# Patient Record
Sex: FEMALE | Race: WHITE | NOT HISPANIC OR LATINO | Employment: OTHER | URBAN - METROPOLITAN AREA
[De-identification: names, ages, dates, MRNs, and addresses within clinical notes are randomized per-mention and may not be internally consistent; named-entity substitution may affect disease eponyms.]

---

## 2018-02-21 NOTE — PATIENT DISCUSSION
(X20.488) Keratoconjunct sicca, not specified as Sjogren's, bilateral - Assesment : Examination revealed Dry Eye Syndrome - Plan : Monitor for changes. Advised patient to call our office with decreased vision or increased symptoms.

## 2018-02-21 NOTE — PATIENT DISCUSSION
(H25.013) Cortical age-related cataract, bilateral - Assesment : Examination revealed Cortical Senile Cataract. Patient is currently asymptomatic and functioning well. - Plan : Monitor for changes. Advised patient to call our office with decreased vision or increased symptoms.  1 year Exam

## 2018-12-21 NOTE — PATIENT DISCUSSION
(J24.201) Keratoconjunct sicca, not specified as Sjogren's, bilateral - Assesment : Examination revealed Dry Eye Syndrome - Plan : Monitor for changes. Advised patient to call our office with decreased vision or increased symptoms.

## 2018-12-21 NOTE — PATIENT DISCUSSION
(H43.811) Vitreous degeneration, right eye - Assesment : Examination revealed PVD. No holes, tears or breaks seen with 360 view. - Plan : Monitor for changes. Advised patient to call our office with decreased vision or an increase in flashes and/or floaters. RTC 1 year/EXAM, or sooner if symptoms worsen or does not improve.

## 2019-11-18 ENCOUNTER — RETINA CONSULT (OUTPATIENT)
Dept: URBAN - METROPOLITAN AREA CLINIC 43 | Facility: CLINIC | Age: 69
End: 2019-11-18

## 2019-11-18 DIAGNOSIS — E11.3293: ICD-10-CM

## 2019-11-18 DIAGNOSIS — H35.363: ICD-10-CM

## 2019-11-18 DIAGNOSIS — H35.723: ICD-10-CM

## 2019-11-18 DIAGNOSIS — H35.3122: ICD-10-CM

## 2019-11-18 DIAGNOSIS — H35.033: ICD-10-CM

## 2019-11-18 DIAGNOSIS — Z79.4: ICD-10-CM

## 2019-11-18 DIAGNOSIS — H35.3112: ICD-10-CM

## 2019-11-18 PROCEDURE — 92134 CPTRZ OPH DX IMG PST SGM RTA: CPT

## 2019-11-18 PROCEDURE — 9222550 BILAT EXTENDED OPHTHALMOSCOPY, FIRST

## 2019-11-18 PROCEDURE — 92250 FUNDUS PHOTOGRAPHY W/I&R: CPT

## 2019-11-18 PROCEDURE — 99204 OFFICE O/P NEW MOD 45 MIN: CPT

## 2019-11-18 PROCEDURE — 92235 FLUORESCEIN ANGRPH MLTIFRAME: CPT

## 2019-11-18 ASSESSMENT — VISUAL ACUITY
OD_CC: CF 3FT
OS_CC: 20/400

## 2019-11-18 ASSESSMENT — TONOMETRY
OD_IOP_MMHG: 14
OS_IOP_MMHG: 15

## 2020-02-20 ENCOUNTER — EST. PATIENT EMERGENCY (OUTPATIENT)
Dept: URBAN - METROPOLITAN AREA CLINIC 43 | Facility: CLINIC | Age: 70
End: 2020-02-20

## 2020-02-20 DIAGNOSIS — H43.813: ICD-10-CM

## 2020-02-20 PROCEDURE — 92012 INTRM OPH EXAM EST PATIENT: CPT

## 2020-02-20 ASSESSMENT — TONOMETRY
OS_IOP_MMHG: 12
OD_IOP_MMHG: 12

## 2020-02-20 ASSESSMENT — VISUAL ACUITY: OS_CC: 20/200

## 2021-02-01 ENCOUNTER — ESTABLISHED COMPREHENSIVE EXAM (OUTPATIENT)
Dept: URBAN - METROPOLITAN AREA CLINIC 43 | Facility: CLINIC | Age: 71
End: 2021-02-01

## 2021-02-01 DIAGNOSIS — E11.3293: ICD-10-CM

## 2021-02-01 DIAGNOSIS — H35.033: ICD-10-CM

## 2021-02-01 DIAGNOSIS — H34.8312: ICD-10-CM

## 2021-02-01 DIAGNOSIS — H35.723: ICD-10-CM

## 2021-02-01 DIAGNOSIS — Z79.4: ICD-10-CM

## 2021-02-01 DIAGNOSIS — H35.3112: ICD-10-CM

## 2021-02-01 DIAGNOSIS — H35.3122: ICD-10-CM

## 2021-02-01 DIAGNOSIS — H43.813: ICD-10-CM

## 2021-02-01 DIAGNOSIS — H35.30: ICD-10-CM

## 2021-02-01 DIAGNOSIS — H35.363: ICD-10-CM

## 2021-02-01 PROCEDURE — 92202 OPSCPY EXTND ON/MAC DRAW: CPT

## 2021-02-01 PROCEDURE — 92287 ANT SGM IMG IR FLRSCN ANGRPH: CPT

## 2021-02-01 PROCEDURE — 92273 FULL FIELD ERG W/I&R: CPT

## 2021-02-01 PROCEDURE — 92242 FLUORESCEIN&ICG ANGIOGRAPHY: CPT

## 2021-02-01 PROCEDURE — 99214 OFFICE O/P EST MOD 30 MIN: CPT

## 2021-02-01 PROCEDURE — 92134 CPTRZ OPH DX IMG PST SGM RTA: CPT

## 2021-02-01 ASSESSMENT — TONOMETRY
OD_IOP_MMHG: 16
OS_IOP_MMHG: 16

## 2021-02-01 ASSESSMENT — VISUAL ACUITY
OS_CC: 20/200
OD_CC: CF 3FT

## 2022-02-07 ENCOUNTER — COMPREHENSIVE EXAM (OUTPATIENT)
Dept: URBAN - METROPOLITAN AREA CLINIC 43 | Facility: CLINIC | Age: 72
End: 2022-02-07

## 2022-02-07 DIAGNOSIS — H35.363: ICD-10-CM

## 2022-02-07 DIAGNOSIS — E11.3293: ICD-10-CM

## 2022-02-07 DIAGNOSIS — H35.723: ICD-10-CM

## 2022-02-07 DIAGNOSIS — H35.033: ICD-10-CM

## 2022-02-07 DIAGNOSIS — H35.3122: ICD-10-CM

## 2022-02-07 DIAGNOSIS — Z79.4: ICD-10-CM

## 2022-02-07 DIAGNOSIS — H34.8312: ICD-10-CM

## 2022-02-07 DIAGNOSIS — H43.813: ICD-10-CM

## 2022-02-07 DIAGNOSIS — H35.3112: ICD-10-CM

## 2022-02-07 PROCEDURE — 92250 FUNDUS PHOTOGRAPHY W/I&R: CPT

## 2022-02-07 PROCEDURE — 92235 FLUORESCEIN ANGRPH MLTIFRAME: CPT

## 2022-02-07 PROCEDURE — 92287 ANT SGM IMG IR FLRSCN ANGRPH: CPT

## 2022-02-07 PROCEDURE — 92273 FULL FIELD ERG W/I&R: CPT

## 2022-02-07 PROCEDURE — 99214 OFFICE O/P EST MOD 30 MIN: CPT

## 2022-02-07 ASSESSMENT — VISUAL ACUITY
OS_CC: CF 2FT
OD_CC: CF 2FT

## 2022-02-07 ASSESSMENT — TONOMETRY
OD_IOP_MMHG: 18
OS_IOP_MMHG: 17

## 2024-02-19 ENCOUNTER — COMPREHENSIVE EXAM (OUTPATIENT)
Dept: URBAN - METROPOLITAN AREA CLINIC 43 | Facility: CLINIC | Age: 74
End: 2024-02-19

## 2024-02-19 DIAGNOSIS — H35.63: ICD-10-CM

## 2024-02-19 DIAGNOSIS — H43.393: ICD-10-CM

## 2024-02-19 DIAGNOSIS — H35.723: ICD-10-CM

## 2024-02-19 DIAGNOSIS — H35.033: ICD-10-CM

## 2024-02-19 DIAGNOSIS — H43.813: ICD-10-CM

## 2024-02-19 DIAGNOSIS — H35.363: ICD-10-CM

## 2024-02-19 DIAGNOSIS — Z79.4: ICD-10-CM

## 2024-02-19 DIAGNOSIS — H35.3134: ICD-10-CM

## 2024-02-19 DIAGNOSIS — H35.30: ICD-10-CM

## 2024-02-19 DIAGNOSIS — E11.3293: ICD-10-CM

## 2024-02-19 DIAGNOSIS — H53.9: ICD-10-CM

## 2024-02-19 DIAGNOSIS — H34.8312: ICD-10-CM

## 2024-02-19 PROCEDURE — 92273 FULL FIELD ERG W/I&R: CPT

## 2024-02-19 PROCEDURE — 92287 ANT SGM IMG IR FLRSCN ANGRPH: CPT

## 2024-02-19 PROCEDURE — 99214 OFFICE O/P EST MOD 30 MIN: CPT

## 2024-02-19 PROCEDURE — 92134 CPTRZ OPH DX IMG PST SGM RTA: CPT

## 2024-02-19 PROCEDURE — 92242 FLUORESCEIN&ICG ANGIOGRAPHY: CPT

## 2024-02-19 ASSESSMENT — VISUAL ACUITY
OD_CC: CF 2FT
OS_PH: 20/400
OD_PH: CF 3FT

## 2024-02-19 ASSESSMENT — TONOMETRY
OD_IOP_MMHG: 19
OS_IOP_MMHG: 15

## 2025-02-24 ENCOUNTER — COMPREHENSIVE EXAM (OUTPATIENT)
Age: 75
End: 2025-02-24

## 2025-02-24 DIAGNOSIS — H35.63: ICD-10-CM

## 2025-02-24 DIAGNOSIS — H34.8312: ICD-10-CM

## 2025-02-24 DIAGNOSIS — H35.3134: ICD-10-CM

## 2025-02-24 DIAGNOSIS — H35.363: ICD-10-CM

## 2025-02-24 DIAGNOSIS — H43.393: ICD-10-CM

## 2025-02-24 DIAGNOSIS — H35.033: ICD-10-CM

## 2025-02-24 DIAGNOSIS — Z79.4: ICD-10-CM

## 2025-02-24 DIAGNOSIS — E11.3293: ICD-10-CM

## 2025-02-24 DIAGNOSIS — H35.30: ICD-10-CM

## 2025-02-24 DIAGNOSIS — H35.09: ICD-10-CM

## 2025-02-24 DIAGNOSIS — H35.723: ICD-10-CM

## 2025-02-24 PROCEDURE — 92287 ANT SGM IMG IR FLRSCN ANGRPH: CPT

## 2025-02-24 PROCEDURE — 99213 OFFICE O/P EST LOW 20 MIN: CPT

## 2025-02-24 PROCEDURE — 92242 FLUORESCEIN&ICG ANGIOGRAPHY: CPT

## 2025-02-24 PROCEDURE — 92273 FULL FIELD ERG W/I&R: CPT

## 2025-02-24 PROCEDURE — 92250 FUNDUS PHOTOGRAPHY W/I&R: CPT
